# Patient Record
Sex: MALE | Race: WHITE | Employment: FULL TIME | URBAN - METROPOLITAN AREA
[De-identification: names, ages, dates, MRNs, and addresses within clinical notes are randomized per-mention and may not be internally consistent; named-entity substitution may affect disease eponyms.]

---

## 2017-07-05 ENCOUNTER — OFFICE VISIT (OUTPATIENT)
Dept: ORTHOPEDIC SURGERY | Age: 52
End: 2017-07-05

## 2017-07-05 VITALS
DIASTOLIC BLOOD PRESSURE: 77 MMHG | HEART RATE: 68 BPM | HEIGHT: 77 IN | SYSTOLIC BLOOD PRESSURE: 113 MMHG | BODY MASS INDEX: 29.52 KG/M2 | WEIGHT: 250 LBS

## 2017-07-05 DIAGNOSIS — M25.562 PAIN IN BOTH KNEES, UNSPECIFIED CHRONICITY: ICD-10-CM

## 2017-07-05 DIAGNOSIS — M25.561 PAIN IN BOTH KNEES, UNSPECIFIED CHRONICITY: ICD-10-CM

## 2017-07-05 DIAGNOSIS — M17.0 PRIMARY OSTEOARTHRITIS OF BOTH KNEES: Primary | ICD-10-CM

## 2017-07-05 PROCEDURE — 73564 X-RAY EXAM KNEE 4 OR MORE: CPT | Performed by: PHYSICIAN ASSISTANT

## 2017-07-05 PROCEDURE — 99213 OFFICE O/P EST LOW 20 MIN: CPT | Performed by: PHYSICIAN ASSISTANT

## 2017-07-05 PROCEDURE — 20610 DRAIN/INJ JOINT/BURSA W/O US: CPT | Performed by: PHYSICIAN ASSISTANT

## 2017-07-05 RX ORDER — MELOXICAM 15 MG/1
15 TABLET ORAL DAILY
Qty: 30 TABLET | Refills: 3 | Status: SHIPPED | OUTPATIENT
Start: 2017-07-05 | End: 2017-11-13 | Stop reason: SDUPTHER

## 2017-07-05 RX ORDER — FLUTICASONE FUROATE AND VILANTEROL TRIFENATATE 100; 25 UG/1; UG/1
1 POWDER RESPIRATORY (INHALATION) DAILY PRN
COMMUNITY
Start: 2017-06-17

## 2017-11-13 DIAGNOSIS — M17.0 PRIMARY OSTEOARTHRITIS OF BOTH KNEES: ICD-10-CM

## 2017-11-13 DIAGNOSIS — M25.562 PAIN IN BOTH KNEES, UNSPECIFIED CHRONICITY: ICD-10-CM

## 2017-11-13 DIAGNOSIS — M25.561 PAIN IN BOTH KNEES, UNSPECIFIED CHRONICITY: ICD-10-CM

## 2017-11-13 RX ORDER — MELOXICAM 15 MG/1
15 TABLET ORAL DAILY
Qty: 30 TABLET | Refills: 1 | Status: SHIPPED | OUTPATIENT
Start: 2017-11-13 | End: 2018-01-31 | Stop reason: SDUPTHER

## 2017-11-29 ENCOUNTER — OFFICE VISIT (OUTPATIENT)
Dept: ORTHOPEDIC SURGERY | Age: 52
End: 2017-11-29

## 2017-11-29 VITALS — WEIGHT: 250 LBS | HEIGHT: 77 IN | BODY MASS INDEX: 29.52 KG/M2

## 2017-11-29 DIAGNOSIS — M17.0 PRIMARY OSTEOARTHRITIS OF BOTH KNEES: Primary | ICD-10-CM

## 2017-11-29 PROCEDURE — 20611 DRAIN/INJ JOINT/BURSA W/US: CPT | Performed by: PHYSICIAN ASSISTANT

## 2017-11-29 PROCEDURE — 99213 OFFICE O/P EST LOW 20 MIN: CPT | Performed by: PHYSICIAN ASSISTANT

## 2017-11-29 NOTE — PROGRESS NOTES
3ML .5% MARCAINE RTU#[id number] LOT#[id number] EXP 12/18  2ML BETAMETHASONE KOL#[id number] LOT#[id number] EXP 2/19  1ML DEPOL MEDROL O#[id number] YRA#[id number]  EXP 4/20  RT AND LT KNEE INJECTIONS

## 2017-11-30 NOTE — PROGRESS NOTES
Diagnosis: Left and right knee osteoarthritis. Procedure: left and right knee cortisone injection    I discussed in detail the risks, benefits and complications of aninjection which included but are not limited to infection, skin reactions, hot swollen joint, and anaphylaxis with the patient. The patient verbalized understanding and gave informed consent for the left and right knee injection. The patient's knee was flexed to 90° and the skin prepped using sterile alcohol solution. A sterile 22-gauge needle was inserted into the knee and the mixture of 2ml Beta-Beta, 1 mL of Depo-Medrol 40 mg,3 mL of 0.25% Marcaine was injected under sterile technique. The needle was withdrawn and the puncture site sealed with a Band-Aid. Technique: Under sterile conditions a SonBooster ultrasound unit with a variable frequency (6.0-15.0 MHz) linear transducer was used to localize the placement of a 22-gauge needle into the knee joint. Findings: Successful needle placement for knee injection. Final images were taken and saved for permanent record. The patient tolerated the injection well. The patient was instructed to call the office immediately if there is any pain, redness, warmth, fever, or chills.

## 2018-01-25 ENCOUNTER — OFFICE VISIT (OUTPATIENT)
Dept: ORTHOPEDIC SURGERY | Age: 53
End: 2018-01-25

## 2018-01-25 VITALS
HEIGHT: 77 IN | HEART RATE: 59 BPM | WEIGHT: 250 LBS | BODY MASS INDEX: 29.52 KG/M2 | DIASTOLIC BLOOD PRESSURE: 76 MMHG | SYSTOLIC BLOOD PRESSURE: 121 MMHG

## 2018-01-25 DIAGNOSIS — M25.561 PAIN IN BOTH KNEES, UNSPECIFIED CHRONICITY: ICD-10-CM

## 2018-01-25 DIAGNOSIS — M17.12 PRIMARY OSTEOARTHRITIS OF LEFT KNEE: Primary | ICD-10-CM

## 2018-01-25 DIAGNOSIS — Z86.718 HISTORY OF DVT OF LOWER EXTREMITY: ICD-10-CM

## 2018-01-25 DIAGNOSIS — M25.562 PAIN IN BOTH KNEES, UNSPECIFIED CHRONICITY: ICD-10-CM

## 2018-01-25 PROCEDURE — L1830 KO IMMOB CANVAS LONG PRE OTS: HCPCS | Performed by: ORTHOPAEDIC SURGERY

## 2018-01-25 PROCEDURE — 99214 OFFICE O/P EST MOD 30 MIN: CPT | Performed by: ORTHOPAEDIC SURGERY

## 2018-01-25 NOTE — PROGRESS NOTES
place and person. The patient's mood and affect are appropriate. Lymphatic: The lymphatic examination bilaterally reveals all areas to be without enlargement or induration. Vascular: Examination reveals no swelling or calf tenderness. Peripheral pulses are palpable and 2+. Neurological: The patient has good coordination. There is no weakness or sensory deficit. Body mass index is 29.64 kg/m². Right and left Knee Examination:    Inspection:  No erythema or signs of infection. There are no cutaneous lesions. There is a varus deformity to both knees, worse on the right than the left    Palpation:  There is tenderness to palpation along the medial greater than patellofemoral joint line. Range of Motion:  0 extension to 120 of active flexion. Strength:  4+/5 quadriceps and hamstrings    Special Tests: The knee is stable to varus valgus stressing/anterior posterior drawer. Negative Homans test.                                 Skin: There are no rashes, ulcerations or lesions. Gait: mildly antalgic     Reflex 2+ patellar    Examination of the right and left hip reveals intact skin. The patient demonstrates full painless range of motion with regards to flexion, abduction, internal and external rotation. There is no tenderness about the greater trochanter. There is a negative straight leg raise against resistance. Strength is 5/5 throughout all planes. Radiology:   X-rays obtained and reviewed in office:  Views 4 views including AP weightbearing, PA flexed weightbearing, lateral, and skyline  Location  right and left knee:    Impression:   There is advanced medial joint space thinning with sclerosis and osteophyte formation present bilaterally. Patient has varus deformity. There is complete loss of articular cartilage space consistent with severe osteoarthritis.  The patellofemoral joint has osteophyte formation but the spaces are well-maintained on both sides  No fractures are identified. Impression:  Encounter Diagnoses   Name Primary?  Pain in both knees, unspecified chronicity     Primary osteoarthritis of left knee Yes       Office Procedures:  Orders Placed This Encounter   Procedures    Knee Bilateral Standard Extended VW     [id number]         Treatment Plan:  I discussed with the patient the nature of osteoarthritis of the knee. We talked about treatment of arthritis and the various options that are involved with this. The patient understands that the treatments can vary from essentially doing nothing to a total joint replacement arthroplasty for arthritis. I then went on to describe the utilization of glucosamine and chondroitin sulfate as a joint nutrition product. We talked about the fact that this is essentially a joint vitamin with typically minimal side effects. We also talked about utilization of prescription over-the-counter anti-inflammatory medications as the next option. We also discussed the possibility of brace wear or orthotic wear if the patient has significant varus alignment. We then went on to discuss the possibility of Visco supplementation with hyaluronate products. We talked about the typical course of this type of treatment and the fact that often times in the treatment for significant arthritis, this is successful less than half the time. We also talked about the corticosteroid injections and the fact that this can give a brief window of relief, but does not cure the problem; in fact, the pain often has a rebound effect in 6-10 weeks after the steroid has worn off. We also discussed arthroscopy surgery in attempts to debride the joint, but the fact that this is relatively unreliable treatment in the face of significant arthritis. It can occasionally be used, particularly if there is significant meniscus pathology. Lastly we discussed total joint replacement arthroplasty as the final and definitive step in treatment of arthritis.  Patient realizes the

## 2018-01-29 ENCOUNTER — TELEPHONE (OUTPATIENT)
Dept: ORTHOPEDIC SURGERY | Age: 53
End: 2018-01-29

## 2018-01-29 DIAGNOSIS — M25.562 LEFT KNEE PAIN, UNSPECIFIED CHRONICITY: Primary | ICD-10-CM

## 2018-01-31 ENCOUNTER — HOSPITAL ENCOUNTER (OUTPATIENT)
Dept: PHYSICAL THERAPY | Age: 53
Discharge: HOME | End: 2018-01-31
Admitting: ORTHOPAEDIC SURGERY

## 2018-01-31 DIAGNOSIS — M25.562 PAIN IN BOTH KNEES, UNSPECIFIED CHRONICITY: ICD-10-CM

## 2018-01-31 DIAGNOSIS — M25.561 PAIN IN BOTH KNEES, UNSPECIFIED CHRONICITY: ICD-10-CM

## 2018-01-31 DIAGNOSIS — M17.0 PRIMARY OSTEOARTHRITIS OF BOTH KNEES: ICD-10-CM

## 2018-01-31 NOTE — PLAN OF CARE
functional strength   []Reduced balance/proprioceptive control   []other:      Functional Activity Limitations (from functional questionnaire and intake)   [x]Reduced ability to tolerate prolonged functional positions   []Reduced ability or difficulty with changes of positions or transfers between positions   []Reduced ability to maintain good posture and demonstrate good body mechanics with sitting, bending, and lifting   [x]Reduced ability to sleep   [] Reduced ability or tolerance with driving and/or computer work   []Reduced ability to perform lifting, carrying tasks   [x]Reduced ability to squat   []Reduced ability to forward bend   [x]Reduced ability to ambulate prolonged functional periods/distances/surfaces   [x]Reduced ability to ascend/descend stairs   [x]Reduced ability to run, hop, cut or jump   []other:    Participation Restrictions   []Reduced participation in self care activities   []Reduced participation in home management activities   [x]Reduced participation in work activities   [x]Reduced participation in social activities. [x]Reduced participation in sport/recreation activities. Classification :    []Signs/symptoms consistent with post-surgical status including decreased ROM, strength and function.    []Signs/symptoms consistent with joint sprain/strain   []Signs/symptoms consistent with patella-femoral syndrome   [x]Signs/symptoms consistent with knee OA/hip OA   []Signs/symptoms consistent with internal derangement of knee/Hip   []Signs/symptoms consistent with functional hip weakness/NMR control      []Signs/symptoms consistent with tendinitis/tendinosis    []signs/symptoms consistent with pathology which may benefit from Dry needling      []other:      Prognosis/Rehab Potential:      []Excellent   []Good    [x]Fair   []Poor    Tolerance of evaluation/treatment:    []Excellent   []Good    [x]Fair   []Poor  Physical Therapy Evaluation Complexity Justification  [x] A history of present problem with:  [] no personal factors and/or comorbidities that impact the plan of care;  [x]1-2 personal factors and/or comorbidities that impact the plan of care  []3 personal factors and/or comorbidities that impact the plan of care  [x] An examination of body systems using standardized tests and measures addressing any of the following: body structures and functions (impairments), activity limitations, and/or participation restrictions;:  [] a total of 1-2 or more elements   [x] a total of 3 or more elements   [] a total of 4 or more elements   [x] A clinical presentation with:  [x] stable and/or uncomplicated characteristics   [] evolving clinical presentation with changing characteristics  [] unstable and unpredictable characteristics;   [x] Clinical decision making of [x] low, [] moderate, [] high complexity using standardized patient assessment instrument and/or measurable assessment of functional outcome. [x] EVAL (LOW) 38438 (typically 20 minutes face-to-face)  [] EVAL (MOD) 81518 (typically 30 minutes face-to-face)  [] EVAL (HIGH) 07848 (typically 45 minutes face-to-face)  [] RE-EVAL       PLAN:   Frequency/Duration:  1 visit    Interventions:  [x]  Therapeutic exercise including: strength training, ROM, for Lower extremity and core   [x]  NMR activation and proprioception for LE, Glutes and Core   []  Manual therapy as indicated for LE, Hip and spine to include: Dry Needling/IASTM, STM, PROM, Gr I-IV mobilizations, manipulation. [] Modalities as needed that may include: thermal agents, E-stim, Biofeedback, US, iontophoresis as indicated  [x] Patient education on joint protection, postural re-education, activity modification, progression of HEP. [x] Patient appears to be functionally prepared for surgery and will continue with a home exercise program until surgery date.   [] Patient will be ready for surgery with a short period of structured physical therapy  [] Patient does not appear to be functionally ready for surgery and requires a prolonged  structure program    At this point, it appears patient's discharge status from hospital should be:   [x] Home with home exercise program  [] Home with home health care  [] Skilled nursing facility    HEP instruction: (see flowsheet)    GOALS:  Patient stated goal: \"To be ready for surgery. \"    Therapist goals for Patient:   Short Term Goals: To be achieved in: 1 visit  1. Independent in HEP and progression per patient tolerance, in order to prevent re-injury.          Electronically signed by:  Carri Jernigan, [address] [facility], DPT [id number]

## 2018-02-01 ENCOUNTER — HOSPITAL ENCOUNTER (OUTPATIENT)
Dept: PHYSICAL THERAPY | Age: 53
Discharge: HOME | End: 2018-02-28
Attending: ORTHOPAEDIC SURGERY | Admitting: ORTHOPAEDIC SURGERY

## 2018-02-01 RX ORDER — MELOXICAM 15 MG/1
15 TABLET ORAL DAILY
Qty: 30 TABLET | Refills: 0 | Status: SHIPPED | OUTPATIENT
Start: 2018-02-01 | End: 2018-03-05 | Stop reason: SDUPTHER

## 2018-02-08 ENCOUNTER — TELEPHONE (OUTPATIENT)
Dept: ORTHOPEDIC SURGERY | Age: 53
End: 2018-02-08

## 2018-02-08 NOTE — TELEPHONE ENCOUNTER
Patient scheduled for surgery 3/6/18, nurse navigator called patient to make sure visit with primary care physician for pre-op visit was scheduled and pre-op lab work was scheduled. Discussed PAT class every Tuesday at [address]. PCP Scheduled: Scheduled    Specialist: Cardiologist/ Hemoc/ Vascular: Cardiologist: for PVCs - scheduled 2/14/18  Hemotologist- in discussion with Dr. Ale Arzola about post op medication- pt has history of blood clots. Prehab scheduled: Completed    CT scan: 2/19/18     Lab work schedule: Completed    Discharge plans: Home with HHC vs Out ptPT per pre-habs assessment. Who will care for patient after discharge: Wife    Equipment patient has at home: Crutches    OrthoVitals: Has kit, is registered, and has no questions    Instructed pt a pre admission nurse would call to review medical history and medications prior to surgery. Also instructed patient to call with any questions or concerns.     Jazmin Davis  Orthopedic Nurse Navigator  Phone number [phone]

## 2018-02-20 ENCOUNTER — TELEPHONE (OUTPATIENT)
Dept: ORTHOPEDIC SURGERY | Age: 53
End: 2018-02-20

## 2018-02-23 ENCOUNTER — TELEPHONE (OUTPATIENT)
Dept: ORTHOPEDIC SURGERY | Age: 53
End: 2018-02-23

## 2018-03-01 ENCOUNTER — HOSPITAL ENCOUNTER (OUTPATIENT)
Dept: PHYSICAL THERAPY | Age: 53
Discharge: HOME | End: 2018-03-31
Attending: ORTHOPAEDIC SURGERY | Admitting: ORTHOPAEDIC SURGERY

## 2018-03-05 DIAGNOSIS — M17.0 PRIMARY OSTEOARTHRITIS OF BOTH KNEES: ICD-10-CM

## 2018-03-05 DIAGNOSIS — M25.562 PAIN IN BOTH KNEES, UNSPECIFIED CHRONICITY: ICD-10-CM

## 2018-03-05 DIAGNOSIS — M25.561 PAIN IN BOTH KNEES, UNSPECIFIED CHRONICITY: ICD-10-CM

## 2018-03-06 PROBLEM — Z96.652 STATUS POST TOTAL LEFT KNEE REPLACEMENT: Status: ACTIVE | Noted: 2018-03-06

## 2018-03-09 ENCOUNTER — TELEPHONE (OUTPATIENT)
Dept: ORTHOPEDIC SURGERY | Age: 53
End: 2018-03-09

## 2018-03-09 RX ORDER — MELOXICAM 15 MG/1
15 TABLET ORAL DAILY
Qty: 30 TABLET | Refills: 0 | Status: SHIPPED | OUTPATIENT
Start: 2018-03-09 | End: 2018-04-08 | Stop reason: SDUPTHER

## 2018-03-10 ENCOUNTER — HOSPITAL ENCOUNTER (OUTPATIENT)
Dept: PHYSICAL THERAPY | Age: 53
Discharge: HOME | End: 2018-03-11
Admitting: ORTHOPAEDIC SURGERY

## 2018-03-10 NOTE — FLOWSHEET NOTE
[facility], [address] [place] [address]  Phone: [phone]  Fax [phone]    Physical Therapy Daily Treatment Note  Date:  3/10/2018    Patient Name:  Zoya Metzger    :  1965  MRN: [id number]  Restrictions/Precautions:    Physician Information:  Referring Practitioner: Dr. Rafia Duarte  Medical/Treatment Diagnosis Information:  · Diagnosis: Left Knee OA (M17.2) s/p TKR 3/6/18  · Treatment Diagnosis: Left Knee Pain (M25.562) / Difficulty Walking (R26.2)    [] Conservative / [x] Surgical - DOS: 3/6/18  Therapy Diagnosis/Practice Pattern:  Practice Pattern H: Joint Arthroplasty  Insurance/Certification information:  PT Insurance Information: ANTHEM  - 50/[id number]-80/20-$0CP-60PT/OT-NO AUTH  (1 used for pre-op)  Plan of care signed: [] YES  [x] NO  Number of Comorbidities:  []0     [x]1-2    []3+  Date of Patient follow up with Physician:     G-Code (if applicable):      Date G-Code Applied:  3/10/18  PT G-Codes  Functional Assessment Tool Used: LEFS  Score: 93%  Functional Limitation: Mobility: Walking and moving around  Mobility: Walking and Moving Around Current Status (): At least 80 percent but less than 100 percent impaired, limited or restricted  Mobility: Walking and Moving Around Goal Status ():  At least 20 percent but less than 40 percent impaired, limited or restricted    Progress Note: [x]  Yes  []  No  Next due by: Visit #10        Latex Allergy:  [x]NO      []YES  Preferred Language for Healthcare:   [x]English       []other:    Visit # Insurance Allowable Reporting Period   1 61 Begin Date: 3/10/2018               End Date:      RECERT DUE BY: 34 (12 weeks)    SUBJECTIVE:  See eval    OBJECTIVE: See eval  Observation:  Palpation:     Test used Initial score Current Score   Pain Summary VAS 7-8/10    Functional questionnaire LEFS 93%    ROM flexion 90 deg (heel slide)     extension Lacking 8 deg increase in Strength to good proximal hip strength and control, within 5lb HHD in LE to allow for proper functional mobility as indicated by patients Functional Deficits. 4. Patient will return to ADLs/  functional activities without increased symptoms or restriction. 5. Patient will ambulate with symmetrical gait and reciprocal gait on stairs necessary to perform work duties and manage home. 6. Be prepared for right TKR. New or Updated Goals (if applicable):  [x] No change to goals established upon initial eval/last progress note:  New Goals:    Progression Towards Functional goals:   [] Patient is progressing as expected towards functional goals listed. [] Progression is slowed due to complexities listed. [] Progression has been slowed due to co-morbidities.   [x] Plan just implemented, too soon to assess goals progression  [] Other:     ASSESSMENT:    [] Improvement noted relative to goals:  [] No Improvement noted related to goals:  Summary/Patient's response to treatment: See Eval    Treatment/Activity Tolerance:  [x] Patient tolerated treatment well [] Patient limited by fatique  [] Patient limited by pain  [] Patient limited by other medical complications  [] Other:     Prognosis: [x] Good [] Fair  [] Poor    Patient Requires Follow-up: [x] Yes  [] No    PLAN: See eval  [] Continue per plan of care [] Alter current plan (see comments)  [x] Plan of care initiated [] Hold pending MD visit [] Discharge    Electronically signed by: Keya Gaytan 429

## 2018-03-10 NOTE — PLAN OF CARE
[facility], [address] [facility]  [address] [place] [address], [address] [place] [address]  Phone: [phone]  Fax [phone]     Physical Therapy Certification    Dear Referring Practitioner: Dr. Natividad Chau,    We had the pleasure of evaluating the following patient for physical therapy services at [address] [place] [address]. A summary of our findings can be found in the initial assessment below. This includes our plan of care. If you have any questions or concerns regarding these findings, please do not hesitate to contact me at the office phone number checked above. Thank you for the referral.       Physician Signature:_______________________________Date:__________________  By signing above (or electronic signature), therapists plan is approved by physician    Patient: Yann Trammell   : 1965   MRN: [id number]  Referring Physician: Referring Practitioner: Dr. Natividad Chau      Evaluation Date: 3/10/2018      Medical Diagnosis Information:  Diagnosis: Left Knee OA (M17.2) s/p TKR 3/6/18   Treatment Diagnosis: Left Knee Pain (M25.562) / Difficulty Walking (R26.2)                                         Insurance information: PT Insurance Information: ANTHEM  - 50/25-300D-80/20-$0CP-60PT/OT-NO AUTH  (1 used for pre-op)     Precautions/ Contra-indications: Hx of DVT, PVCs, right knee OA needing TKR  Latex Allergy:  [x]NO      []YES  Preferred Language for Healthcare:   [x]English       []other:    SUBJECTIVE: Patient stated complaint:  Patient reports chronic hx of bilateral knee pain. Pain in the left knee increasingly worse than right. Kept him awake at night and limited function with work activities and ADLs. Hx of scopes bilateral knees. Hoping to get right TKR in next 6-8 weeks. Left TKR 3/6/18. Nerve block lasted 2 days but continues to have weakness in the quad pt states. Sleeping on recliner couch on 1st floor at this time.   Step to gait with walker standard walker, step to gait pattern    Orthopedic Special Tests: Negative Ayad's                       [x] Patient history, allergies, meds reviewed. Medical chart reviewed. See intake form. Review Of Systems (ROS):  [x]Performed Review of systems (Integumentary, CardioPulmonary, Neurological) by intake and observation. Intake form has been scanned into medical record. Patient has been instructed to contact their primary care physician regarding ROS issues if not already being addressed at this time. Co-morbidities/Complexities (which will affect course of rehabilitation):   []None           Arthritic conditions   []Rheumatoid arthritis (M05.9)  [x]Osteoarthritis (M19.91)   Cardiovascular conditions   []Hypertension (I10)  []Hyperlipidemia (E78.5)  []Angina pectoris (I20)  []Atherosclerosis (I70)   Musculoskeletal conditions   []Disc pathology   []Congenital spine pathologies   [x]Prior surgical intervention  []Osteoporosis (M81.8)  []Osteopenia (M85.8)   Endocrine conditions   []Hypothyroid (E03.9)  []Hyperthyroid Gastrointestinal conditions   []Constipation (F52.59)   Metabolic conditions   []Morbid obesity (E66.01)  []Diabetes type 1(E10.65) or 2 (E11.65)   []Neuropathy (G60.9)     Pulmonary conditions   []Asthma (J45)  []Coughing   []COPD (J44.9)   Psychological Disorders  []Anxiety (F41.9)  []Depression (F32.9)   []Other:   [x]Other:     Hx of DVT  Right Knee OA needing TKR     Barriers to/and or personal factors that will affect rehab potential:              []Age  []Sex              []Motivation/Lack of Motivation                        []Co-Morbidities              []Cognitive Function, education/learning barriers              []Environmental, home barriers              [x]profession/work barriers  []past PT/medical experience  []other:  Justification: Patient has a very physical job to return to along with right knee wanting to be replaced in 6-8 weeks.      Falls Risk Assessment (30 days):   [x]

## 2018-03-15 ENCOUNTER — TELEPHONE (OUTPATIENT)
Dept: CASE MANAGEMENT | Age: 53
End: 2018-03-15

## 2018-03-16 ENCOUNTER — HOSPITAL ENCOUNTER (OUTPATIENT)
Dept: PHYSICAL THERAPY | Age: 53
Discharge: HOME | End: 2018-03-17
Admitting: ORTHOPAEDIC SURGERY

## 2018-03-16 NOTE — FLOWSHEET NOTE
Test used Initial score Current Score   Pain Summary VAS 7-8/10 4/10   Functional questionnaire LEFS 93% 63%   ROM flexion 90 deg (heel slide) 100    extension Lacking 8 deg Lacking 2 degrees   Strength quad Poor Good -    SLR Unable to perform Independent/              RESTRICTIONS/PRECAUTIONS: Hx of DVT, PVCs, right knee OA needing TKR    Exercises/Interventions: Demo HEP with mod cues for proper technique. Therapeutic Ex Sets/reps Notes   Long sitting HS stretch 4 x 20\" HEP   gastroc belt stretch 4 x 20\" HEP   SLR flexion Mod assist x 3  Unable to perform   Quad set 10 x 5\"  HEP   EOB flexion  10 x 5\" HEP   Heel slide with belt 10 x 5\" HEP   SB Heelslides 30x    Hip Abd 3 x 10    Bike  5 min         Mini squats Reviewed for HEP HEP                            Manual Intervention     Patellar mobs, PROM, STM Distal Quad X 10 minutes                             NMR re-education     NMES QS 10 sec on/10 off 8 min    Gait training one crutch  3 min                                           Therapeutic Exercise and NMR EXR  [x] (90246) Provided verbal/tactile cueing for activities related to strengthening, flexibility, endurance, ROM for improvements in LE, proximal hip, and core control with self care, mobility, lifting, ambulation.  [] (59227) Provided verbal/tactile cueing for activities related to improving balance, coordination, kinesthetic sense, posture, motor skill, proprioception  to assist with LE, proximal hip, and core control in self care, mobility, lifting, ambulation and eccentric single leg control.      NMR and Therapeutic Activities:    [x] (96293 or 78261) Provided verbal/tactile cueing for activities related to improving balance, coordination, kinesthetic sense, posture, motor skill, proprioception and motor activation to allow for proper function of core, proximal hip and LE with self care and ADLs  [] (19944) Gait Re-education- Provided training and instruction to the patient for proper LE, core

## 2018-03-17 ENCOUNTER — HOSPITAL ENCOUNTER (OUTPATIENT)
Dept: PHYSICAL THERAPY | Age: 53
Discharge: HOME | End: 2018-02-28

## 2018-03-19 ENCOUNTER — OFFICE VISIT (OUTPATIENT)
Dept: ORTHOPEDIC SURGERY | Age: 53
End: 2018-03-19

## 2018-03-19 ENCOUNTER — HOSPITAL ENCOUNTER (OUTPATIENT)
Dept: PHYSICAL THERAPY | Age: 53
Discharge: HOME | End: 2018-03-20
Admitting: ORTHOPAEDIC SURGERY

## 2018-03-19 DIAGNOSIS — Z96.652 STATUS POST TOTAL LEFT KNEE REPLACEMENT: ICD-10-CM

## 2018-03-19 DIAGNOSIS — M25.562 LEFT KNEE PAIN, UNSPECIFIED CHRONICITY: Primary | ICD-10-CM

## 2018-03-19 PROCEDURE — 99024 POSTOP FOLLOW-UP VISIT: CPT | Performed by: ORTHOPAEDIC SURGERY

## 2018-03-19 RX ORDER — OXYCODONE HYDROCHLORIDE AND ACETAMINOPHEN 5; 325 MG/1; MG/1
1-2 TABLET ORAL EVERY 4 HOURS PRN
Qty: 40 TABLET | Refills: 0 | Status: SHIPPED | OUTPATIENT
Start: 2018-03-19 | End: 2018-03-26

## 2018-03-19 NOTE — FLOWSHEET NOTE
[facility], [address] [place] [address] Subhash  Phone: [phone]  Fax [phone]    Physical Therapy Daily Treatment Note  Date:  3/19/2018    Patient Name:  Mee Benz    :  1965  MRN: [id number]  Restrictions/Precautions:    Physician Information:  Referring Practitioner: Dr. Sudeep Billy  Medical/Treatment Diagnosis Information:  · Diagnosis: Left Knee OA (M17.2) s/p TKR 3/6/18  · Treatment Diagnosis: Left Knee Pain (M25.562) / Difficulty Walking (R26.2)    [] Conservative / [x] Surgical - DOS: 3/6/18  Therapy Diagnosis/Practice Pattern:  Practice Pattern H: Joint Arthroplasty  Insurance/Certification information:  PT Insurance Information: ANTHEM  - [id number]-80/20-$0CP-60PT/OT-NO AUTH  (1 used for pre-op)  Plan of care signed: [] YES  [x] NO  Number of Comorbidities:  []0     [x]1-2    []3+  Date of Patient follow up with Physician:     G-Code (if applicable):      Date G-Code Applied:  3/10/18  PT G-Codes  Functional Assessment Tool Used: LEFS  Score: 93%  Functional Limitation: Mobility: Walking and moving around  Mobility: Walking and Moving Around Current Status (): At least 80 percent but less than 100 percent impaired, limited or restricted  Mobility: Walking and Moving Around Goal Status (): At least 20 percent but less than 40 percent impaired, limited or restricted    Progress Note: []  Yes  [x]  No  Next due by: Visit #10        Latex Allergy:  [x]NO      []YES  Preferred Language for Healthcare:   [x]English       []other:    Visit # Insurance Allowable Reporting Period   4  Begin Date: 3/10/2018               End Date:      RECERT DUE BY:  (12 weeks)    SUBJECTIVE: Patient states that knee is feeling better. Just had a good f/u with MD, said to continue focusing on ROM. OBJECTIVE: Significant improvement with knee flexion ROM today  Patient denies any calf pain.   Observation: quad tone

## 2018-03-22 ENCOUNTER — HOSPITAL ENCOUNTER (OUTPATIENT)
Dept: PHYSICAL THERAPY | Age: 53
Discharge: HOME | End: 2018-03-23
Admitting: ORTHOPAEDIC SURGERY

## 2018-03-22 NOTE — FLOWSHEET NOTE
[facility], [address] [facility] [place]  Phone: [phone]  Fax [phone]    Physical Therapy Daily Treatment Note  Date:  3/22/2018    Patient Name:  Nidia Kapadia    :  1965  MRN: [id number]  Restrictions/Precautions:    Physician Information:  Referring Practitioner: Dr. Vivienne Fry  Medical/Treatment Diagnosis Information:  · Diagnosis: Left Knee OA (M17.2) s/p TKR 3/6/18  · Treatment Diagnosis: Left Knee Pain (M25.562) / Difficulty Walking (R26.2)    [] Conservative / [x] Surgical - DOS: 3/6/18  Therapy Diagnosis/Practice Pattern:  Practice Pattern H: Joint Arthroplasty  Insurance/Certification information:  PT Insurance Information: ANTHEM  - 50/25-300D-80/20-$0CP-60PT/OT-NO AUTH  (1 used for pre-op)  Plan of care signed: [] YES  [x] NO  Number of Comorbidities:  []0     [x]1-2    []3+  Date of Patient follow up with Physician:     G-Code (if applicable):      Date G-Code Applied:  3/10/18  PT G-Codes  Functional Assessment Tool Used: LEFS  Score: 93%  Functional Limitation: Mobility: Walking and moving around  Mobility: Walking and Moving Around Current Status (): At least 80 percent but less than 100 percent impaired, limited or restricted  Mobility: Walking and Moving Around Goal Status (): At least 20 percent but less than 40 percent impaired, limited or restricted    Progress Note: []  Yes  [x]  No  Next due by: Visit #10        Latex Allergy:  [x]NO      []YES  Preferred Language for Healthcare:   [x]English       []other:    Visit # Insurance Allowable Reporting Period   5 59 Begin Date: 3/10/2018               End Date:      RECERT DUE BY: 64 (12 weeks)    SUBJECTIVE: Pt reports having more discomfort than pain. Walking and stairs are getting easier but he isn't yet leading with the L knee. He rides the stationary bike at home everyday to keep the motion. Pain 3/10.      OBJECTIVE: Held SLB

## 2018-03-23 ENCOUNTER — TELEPHONE (OUTPATIENT)
Dept: CASE MANAGEMENT | Age: 53
End: 2018-03-23

## 2018-03-23 NOTE — TELEPHONE ENCOUNTER
Spoke with pt, doing well. Incision status: No drainage or redness    Edema/Swelling/Teds: Almost gone completely. Pain level and status: Discomfort now. Icing and elevating. Use of pain medications: Off pain meds, using tylenol    Blood thinner: Using aspirin    Bowels: Moving fine    Home Care Agency active: N/A    Outpatient therapy: Going well. Do you have all of your medications: Yes    Changes in medications: No    Ortho Vitals: using fine    Follow up appointments:    Future Appointments  Date Time Provider Lupe Leon   3/27/2018 9:30 AM Nelsy Lima, PT [facility] None   3/30/2018 9:30 AM Kemar Suero, PT [facility] None   4/2/2018 11:30 AM Nelsy Lima PT [facility] None   4/4/2018 9:20 AM Cheryle Skains, PA [facility]   4/5/2018 1:30 PM Nelsy Lima PT [facility] None   4/9/2018 3:30 PM Nelsy Lima PT [facility] None   4/12/2018 3:30 PM Conrado Graff, PT [facility] None     Signed off from pt. Instructed pt to call RN or [facility] office with any issues or concerns.

## 2018-03-27 ENCOUNTER — HOSPITAL ENCOUNTER (OUTPATIENT)
Dept: PHYSICAL THERAPY | Age: 53
Discharge: HOME | End: 2018-03-28
Admitting: ORTHOPAEDIC SURGERY

## 2018-03-27 NOTE — FLOWSHEET NOTE
Traction (13253)  [] ES(attended) (51466)      [x] ES (un) (25473):     GOALS:   Patient stated goal: To improved knee strength and mobility     Therapist goals for Patient:   Short Term Goals: To be achieved in: 2 weeks  1. Independent in HEP and progression per patient tolerance, in order to prevent re-injury. 2. Patient will have a decrease in pain to facilitate improvement in movement, function, and ADLs as indicated by Functional Deficits.     Long Term Goals: To be achieved in: 12 weeks  1. Disability index score of 35% or less for the LEFS to assist with reaching prior level of function. 2. Patient will demonstrate increased AROM to 0-120 deg to allow for proper joint functioning as indicated by patients Functional Deficits. 3. Patient will demonstrate an increase in Strength to good proximal hip strength and control, within 5lb HHD in LE to allow for proper functional mobility as indicated by patients Functional Deficits. 4. Patient will return to ADLs/  functional activities without increased symptoms or restriction. 5. Patient will ambulate with symmetrical gait and reciprocal gait on stairs necessary to perform work duties and manage home. 6. Be prepared for right TKR. New or Updated Goals (if applicable):  [x] No change to goals established upon initial eval/last progress note:  New Goals:    Progression Towards Functional goals:   [x] Patient is progressing as expected towards functional goals listed. [] Progression is slowed due to complexities listed. [] Progression has been slowed due to co-morbidities. [] Plan just implemented, too soon to assess goals progression  [] Other:     ASSESSMENT: Pt reports the only time he ever has true soreness is along med quad insertion. He is tight in his HS but has a hard time relaxing during manual HS S. He is doing heel slides at home.  Improved sit to stand ability today from 1 airex but demo min difficulty standing and controlling descent. [x] Improvement noted relative to goals:  [] No Improvement noted related to goals:      Treatment/Activity Tolerance:  [x] Patient tolerated treatment well [] Patient limited by fatique  [] Patient limited by pain  [] Patient limited by other medical complications  [] Other:     Prognosis: [x] Good [] Fair  [] Poor    Patient Requires Follow-up: [x] Yes  [] No    PLAN:   [x] Continue per plan of care [] Alter current plan (see comments)  [] Plan of care initiated [] Hold pending MD visit [] Discharge    Electronically signed by: Manju Giordano PT  Esteban Re, SPT Therapist was present, directed the patient's care, made skilled judgement, and was responsible for assessment and treatment of the patient.

## 2018-03-30 ENCOUNTER — HOSPITAL ENCOUNTER (OUTPATIENT)
Dept: PHYSICAL THERAPY | Age: 53
Discharge: HOME | End: 2018-03-31
Admitting: ORTHOPAEDIC SURGERY

## 2018-04-01 ENCOUNTER — HOSPITAL ENCOUNTER (OUTPATIENT)
Dept: PHYSICAL THERAPY | Age: 53
Discharge: HOME | End: 2018-04-30
Attending: ORTHOPAEDIC SURGERY | Admitting: ORTHOPAEDIC SURGERY

## 2018-04-03 ENCOUNTER — HOSPITAL ENCOUNTER (OUTPATIENT)
Dept: PHYSICAL THERAPY | Age: 53
Discharge: HOME | End: 2018-04-04
Admitting: ORTHOPAEDIC SURGERY

## 2018-04-04 ENCOUNTER — OFFICE VISIT (OUTPATIENT)
Dept: ORTHOPEDIC SURGERY | Age: 53
End: 2018-04-04

## 2018-04-04 DIAGNOSIS — M17.0 PRIMARY OSTEOARTHRITIS OF BOTH KNEES: ICD-10-CM

## 2018-04-04 DIAGNOSIS — Z96.652 STATUS POST TOTAL LEFT KNEE REPLACEMENT: Primary | ICD-10-CM

## 2018-04-04 PROCEDURE — 99213 OFFICE O/P EST LOW 20 MIN: CPT | Performed by: PHYSICIAN ASSISTANT

## 2018-04-05 ENCOUNTER — HOSPITAL ENCOUNTER (OUTPATIENT)
Dept: PHYSICAL THERAPY | Age: 53
Discharge: HOME | End: 2018-04-06
Admitting: ORTHOPAEDIC SURGERY

## 2018-04-05 DIAGNOSIS — M25.561 RIGHT KNEE PAIN, UNSPECIFIED CHRONICITY: Primary | ICD-10-CM

## 2018-04-08 DIAGNOSIS — M25.562 PAIN IN BOTH KNEES, UNSPECIFIED CHRONICITY: ICD-10-CM

## 2018-04-08 DIAGNOSIS — M25.561 PAIN IN BOTH KNEES, UNSPECIFIED CHRONICITY: ICD-10-CM

## 2018-04-08 DIAGNOSIS — M17.0 PRIMARY OSTEOARTHRITIS OF BOTH KNEES: ICD-10-CM

## 2018-04-09 ENCOUNTER — HOSPITAL ENCOUNTER (OUTPATIENT)
Dept: PHYSICAL THERAPY | Age: 53
Discharge: HOME | End: 2018-04-10
Admitting: ORTHOPAEDIC SURGERY

## 2018-04-10 RX ORDER — MELOXICAM 15 MG/1
15 TABLET ORAL DAILY
Qty: 30 TABLET | Refills: 0 | Status: SHIPPED | OUTPATIENT
Start: 2018-04-10 | End: 2018-05-30 | Stop reason: SDUPTHER

## 2018-04-13 ENCOUNTER — HOSPITAL ENCOUNTER (OUTPATIENT)
Dept: PHYSICAL THERAPY | Age: 53
Discharge: HOME | End: 2018-04-14
Admitting: ORTHOPAEDIC SURGERY

## 2018-04-17 ENCOUNTER — HOSPITAL ENCOUNTER (OUTPATIENT)
Dept: PHYSICAL THERAPY | Age: 53
Discharge: HOME | End: 2018-04-18
Admitting: ORTHOPAEDIC SURGERY

## 2018-04-23 ENCOUNTER — TELEPHONE (OUTPATIENT)
Dept: ORTHOPEDIC SURGERY | Age: 53
End: 2018-04-23

## 2018-04-23 ENCOUNTER — HOSPITAL ENCOUNTER (OUTPATIENT)
Dept: PHYSICAL THERAPY | Age: 53
Discharge: HOME | End: 2018-04-24
Admitting: ORTHOPAEDIC SURGERY

## 2018-04-24 ENCOUNTER — TELEPHONE (OUTPATIENT)
Dept: ORTHOPEDIC SURGERY | Age: 53
End: 2018-04-24

## 2018-04-30 ENCOUNTER — OFFICE VISIT (OUTPATIENT)
Dept: ORTHOPEDIC SURGERY | Age: 53
End: 2018-04-30

## 2018-04-30 ENCOUNTER — HOSPITAL ENCOUNTER (OUTPATIENT)
Dept: PHYSICAL THERAPY | Age: 53
Discharge: HOME | End: 2018-05-01
Admitting: ORTHOPAEDIC SURGERY

## 2018-04-30 DIAGNOSIS — Z96.652 STATUS POST TOTAL LEFT KNEE REPLACEMENT: Primary | ICD-10-CM

## 2018-04-30 PROCEDURE — 99024 POSTOP FOLLOW-UP VISIT: CPT | Performed by: ORTHOPAEDIC SURGERY

## 2018-05-01 ENCOUNTER — HOSPITAL ENCOUNTER (OUTPATIENT)
Dept: PHYSICAL THERAPY | Age: 53
Discharge: HOME | End: 2018-05-31
Attending: ORTHOPAEDIC SURGERY | Admitting: ORTHOPAEDIC SURGERY

## 2018-05-01 ENCOUNTER — HOSPITAL ENCOUNTER (OUTPATIENT)
Dept: OTHER | Age: 53
Discharge: HOME | End: 2018-05-01
Attending: ORTHOPAEDIC SURGERY | Admitting: ORTHOPAEDIC SURGERY

## 2018-05-01 LAB
ABO/RH: NORMAL
ALBUMIN SERPL-MCNC: 4.3 G/DL (ref 3.4–5)
ANION GAP SERPL CALCULATED.3IONS-SCNC: 16 MMOL/L (ref 3–16)
ANTIBODY SCREEN: NORMAL
APTT: 27.6 SEC (ref 24.1–34.9)
BASOPHILS ABSOLUTE: 0 K/UL (ref 0–0.2)
BASOPHILS RELATIVE PERCENT: 0.3 %
BILIRUBIN URINE: NEGATIVE
BLOOD, URINE: NEGATIVE
BUN BLDV-MCNC: 17 MG/DL (ref 7–20)
CALCIUM SERPL-MCNC: 8.9 MG/DL (ref 8.3–10.6)
CHLORIDE BLD-SCNC: 104 MMOL/L (ref 99–110)
CLARITY: CLEAR
CO2: 22 MMOL/L (ref 21–32)
COLOR: YELLOW
CREAT SERPL-MCNC: 0.9 MG/DL (ref 0.9–1.3)
EOSINOPHILS ABSOLUTE: 0.3 K/UL (ref 0–0.6)
EOSINOPHILS RELATIVE PERCENT: 4.7 %
GFR AFRICAN AMERICAN: >60
GFR NON-AFRICAN AMERICAN: >60
GLUCOSE BLD-MCNC: 86 MG/DL (ref 70–99)
GLUCOSE URINE: NEGATIVE MG/DL
HCT VFR BLD CALC: 41 % (ref 40.5–52.5)
HEMOGLOBIN: 14 G/DL (ref 13.5–17.5)
INR BLD: 1.17 (ref 0.85–1.15)
KETONES, URINE: NEGATIVE MG/DL
LEUKOCYTE ESTERASE, URINE: NEGATIVE
LYMPHOCYTES ABSOLUTE: 2 K/UL (ref 1–5.1)
LYMPHOCYTES RELATIVE PERCENT: 36.9 %
MCH RBC QN AUTO: 29.4 PG (ref 26–34)
MCHC RBC AUTO-ENTMCNC: 34.3 G/DL (ref 31–36)
MCV RBC AUTO: 85.8 FL (ref 80–100)
MICROSCOPIC EXAMINATION: NORMAL
MONOCYTES ABSOLUTE: 0.6 K/UL (ref 0–1.3)
MONOCYTES RELATIVE PERCENT: 11.4 %
NEUTROPHILS ABSOLUTE: 2.5 K/UL (ref 1.7–7.7)
NEUTROPHILS RELATIVE PERCENT: 46.7 %
NITRITE, URINE: NEGATIVE
PDW BLD-RTO: 14 % (ref 12.4–15.4)
PH UA: 5.5
PLATELET # BLD: 154 K/UL (ref 135–450)
PMV BLD AUTO: 9 FL (ref 5–10.5)
POTASSIUM SERPL-SCNC: 4.9 MMOL/L (ref 3.5–5.1)
PROTEIN UA: NEGATIVE MG/DL
PROTHROMBIN TIME: 13.2 SEC (ref 9.6–13)
RBC # BLD: 4.78 M/UL (ref 4.2–5.9)
SODIUM BLD-SCNC: 142 MMOL/L (ref 136–145)
SPECIFIC GRAVITY UA: >=1.03
TRANSFERRIN: 203 MG/DL (ref 200–360)
URINE TYPE: NORMAL
UROBILINOGEN, URINE: 0.2 E.U./DL
WBC # BLD: 5.4 K/UL (ref 4–11)

## 2018-05-02 ENCOUNTER — TELEPHONE (OUTPATIENT)
Dept: ORTHOPEDIC SURGERY | Age: 53
End: 2018-05-02

## 2018-05-02 LAB
ESTIMATED AVERAGE GLUCOSE: 91.1 MG/DL
HBA1C MFR BLD: 4.8 %
URINE CULTURE, ROUTINE: NORMAL

## 2018-05-07 ENCOUNTER — HOSPITAL ENCOUNTER (OUTPATIENT)
Dept: PHYSICAL THERAPY | Age: 53
Discharge: HOME | End: 2018-05-08
Admitting: ORTHOPAEDIC SURGERY

## 2018-05-09 PROBLEM — M17.11 OSTEOARTHRITIS OF RIGHT KNEE: Status: ACTIVE | Noted: 2018-05-09

## 2018-05-09 PROBLEM — Z96.651 STATUS POST TOTAL RIGHT KNEE REPLACEMENT: Status: ACTIVE | Noted: 2018-05-09

## 2018-05-09 PROBLEM — M17.11 LOCALIZED OSTEOARTHRITIS OF RIGHT KNEE: Chronic | Status: ACTIVE | Noted: 2018-05-09

## 2018-05-10 ENCOUNTER — TELEPHONE (OUTPATIENT)
Dept: CASE MANAGEMENT | Age: 53
End: 2018-05-10

## 2018-05-14 ENCOUNTER — HOSPITAL ENCOUNTER (OUTPATIENT)
Dept: PHYSICAL THERAPY | Age: 53
Discharge: HOME | End: 2018-05-15
Admitting: ORTHOPAEDIC SURGERY

## 2018-05-14 DIAGNOSIS — Z96.651 STATUS POST TOTAL RIGHT KNEE REPLACEMENT: ICD-10-CM

## 2018-05-14 DIAGNOSIS — M17.11 LOCALIZED OSTEOARTHRITIS OF RIGHT KNEE: Primary | Chronic | ICD-10-CM

## 2018-05-14 RX ORDER — OXYCODONE HYDROCHLORIDE AND ACETAMINOPHEN 5; 325 MG/1; MG/1
1 TABLET ORAL EVERY 6 HOURS PRN
Qty: 28 TABLET | Refills: 0 | Status: SHIPPED | OUTPATIENT
Start: 2018-05-14 | End: 2018-05-21

## 2018-05-17 ENCOUNTER — TELEPHONE (OUTPATIENT)
Dept: ORTHOPEDIC SURGERY | Age: 53
End: 2018-05-17

## 2018-05-18 ENCOUNTER — HOSPITAL ENCOUNTER (OUTPATIENT)
Dept: PHYSICAL THERAPY | Age: 53
Discharge: HOME | End: 2018-05-19
Admitting: ORTHOPAEDIC SURGERY

## 2018-05-21 ENCOUNTER — OFFICE VISIT (OUTPATIENT)
Dept: ORTHOPEDIC SURGERY | Age: 53
End: 2018-05-21

## 2018-05-21 ENCOUNTER — HOSPITAL ENCOUNTER (OUTPATIENT)
Dept: PHYSICAL THERAPY | Age: 53
Discharge: HOME | End: 2018-05-22
Admitting: ORTHOPAEDIC SURGERY

## 2018-05-21 VITALS
WEIGHT: 246.03 LBS | BODY MASS INDEX: 28.47 KG/M2 | HEART RATE: 64 BPM | SYSTOLIC BLOOD PRESSURE: 129 MMHG | DIASTOLIC BLOOD PRESSURE: 84 MMHG | HEIGHT: 78 IN

## 2018-05-21 DIAGNOSIS — M17.11 LOCALIZED OSTEOARTHRITIS OF RIGHT KNEE: Chronic | ICD-10-CM

## 2018-05-21 DIAGNOSIS — M25.561 RIGHT KNEE PAIN, UNSPECIFIED CHRONICITY: ICD-10-CM

## 2018-05-21 DIAGNOSIS — Z96.651 STATUS POST TOTAL RIGHT KNEE REPLACEMENT: Primary | ICD-10-CM

## 2018-05-21 PROCEDURE — 99024 POSTOP FOLLOW-UP VISIT: CPT | Performed by: ORTHOPAEDIC SURGERY

## 2018-05-24 ENCOUNTER — HOSPITAL ENCOUNTER (OUTPATIENT)
Dept: PHYSICAL THERAPY | Age: 53
Discharge: HOME | End: 2018-05-25
Admitting: ORTHOPAEDIC SURGERY

## 2018-05-29 ENCOUNTER — HOSPITAL ENCOUNTER (OUTPATIENT)
Dept: PHYSICAL THERAPY | Age: 53
Discharge: HOME | End: 2018-05-30
Admitting: ORTHOPAEDIC SURGERY

## 2018-05-30 ENCOUNTER — TELEPHONE (OUTPATIENT)
Dept: CASE MANAGEMENT | Age: 53
End: 2018-05-30

## 2018-05-30 ENCOUNTER — TELEPHONE (OUTPATIENT)
Dept: ORTHOPEDIC SURGERY | Age: 53
End: 2018-05-30

## 2018-05-30 DIAGNOSIS — M17.0 PRIMARY OSTEOARTHRITIS OF BOTH KNEES: ICD-10-CM

## 2018-05-30 DIAGNOSIS — M25.561 PAIN IN BOTH KNEES, UNSPECIFIED CHRONICITY: ICD-10-CM

## 2018-05-30 DIAGNOSIS — M25.562 PAIN IN BOTH KNEES, UNSPECIFIED CHRONICITY: ICD-10-CM

## 2018-05-31 RX ORDER — MELOXICAM 15 MG/1
15 TABLET ORAL DAILY
Qty: 30 TABLET | Refills: 0 | Status: SHIPPED | OUTPATIENT
Start: 2018-05-31 | End: 2018-07-01 | Stop reason: SDUPTHER

## 2018-06-01 ENCOUNTER — HOSPITAL ENCOUNTER (OUTPATIENT)
Dept: PHYSICAL THERAPY | Age: 53
Discharge: HOME | End: 2018-06-30
Attending: ORTHOPAEDIC SURGERY | Admitting: ORTHOPAEDIC SURGERY

## 2018-06-01 ENCOUNTER — HOSPITAL ENCOUNTER (OUTPATIENT)
Dept: PHYSICAL THERAPY | Age: 53
Discharge: HOME | End: 2018-06-02
Admitting: ORTHOPAEDIC SURGERY

## 2018-06-04 ENCOUNTER — HOSPITAL ENCOUNTER (OUTPATIENT)
Dept: PHYSICAL THERAPY | Age: 53
Discharge: HOME | End: 2018-06-05
Admitting: ORTHOPAEDIC SURGERY

## 2018-06-08 ENCOUNTER — HOSPITAL ENCOUNTER (OUTPATIENT)
Dept: PHYSICAL THERAPY | Age: 53
Discharge: HOME | End: 2018-06-09
Admitting: ORTHOPAEDIC SURGERY

## 2018-06-11 ENCOUNTER — OFFICE VISIT (OUTPATIENT)
Dept: ORTHOPEDIC SURGERY | Age: 53
End: 2018-06-11

## 2018-06-11 ENCOUNTER — HOSPITAL ENCOUNTER (OUTPATIENT)
Dept: PHYSICAL THERAPY | Age: 53
Discharge: HOME | End: 2018-06-12
Admitting: ORTHOPAEDIC SURGERY

## 2018-06-11 VITALS — BODY MASS INDEX: 28.47 KG/M2 | HEIGHT: 78 IN | WEIGHT: 246.03 LBS

## 2018-06-11 DIAGNOSIS — M17.11 LOCALIZED OSTEOARTHRITIS OF RIGHT KNEE: Chronic | ICD-10-CM

## 2018-06-11 DIAGNOSIS — Z96.651 STATUS POST TOTAL RIGHT KNEE REPLACEMENT: Primary | ICD-10-CM

## 2018-06-11 PROCEDURE — 99024 POSTOP FOLLOW-UP VISIT: CPT | Performed by: ORTHOPAEDIC SURGERY

## 2018-06-11 RX ORDER — ASPIRIN 325 MG
325 TABLET ORAL DAILY
COMMUNITY

## 2018-06-14 ENCOUNTER — HOSPITAL ENCOUNTER (OUTPATIENT)
Dept: PHYSICAL THERAPY | Age: 53
Discharge: HOME | End: 2018-06-15
Admitting: ORTHOPAEDIC SURGERY

## 2018-06-18 ENCOUNTER — HOSPITAL ENCOUNTER (OUTPATIENT)
Dept: PHYSICAL THERAPY | Age: 53
Discharge: HOME | End: 2018-06-19
Admitting: ORTHOPAEDIC SURGERY

## 2018-06-21 ENCOUNTER — HOSPITAL ENCOUNTER (OUTPATIENT)
Dept: PHYSICAL THERAPY | Age: 53
Discharge: HOME | End: 2018-06-22
Admitting: ORTHOPAEDIC SURGERY

## 2018-06-25 ENCOUNTER — HOSPITAL ENCOUNTER (OUTPATIENT)
Dept: PHYSICAL THERAPY | Age: 53
Discharge: HOME | End: 2018-06-26
Admitting: ORTHOPAEDIC SURGERY

## 2018-06-28 ENCOUNTER — HOSPITAL ENCOUNTER (OUTPATIENT)
Dept: PHYSICAL THERAPY | Age: 53
Discharge: HOME | End: 2018-06-29
Admitting: ORTHOPAEDIC SURGERY

## 2018-07-01 ENCOUNTER — HOSPITAL ENCOUNTER (OUTPATIENT)
Dept: PHYSICAL THERAPY | Age: 53
Discharge: HOME | End: 2018-07-01
Attending: ORTHOPAEDIC SURGERY | Admitting: ORTHOPAEDIC SURGERY

## 2018-07-01 DIAGNOSIS — M25.562 PAIN IN BOTH KNEES, UNSPECIFIED CHRONICITY: ICD-10-CM

## 2018-07-01 DIAGNOSIS — M25.561 PAIN IN BOTH KNEES, UNSPECIFIED CHRONICITY: ICD-10-CM

## 2018-07-01 DIAGNOSIS — M17.0 PRIMARY OSTEOARTHRITIS OF BOTH KNEES: ICD-10-CM

## 2018-07-02 ENCOUNTER — HOSPITAL ENCOUNTER (OUTPATIENT)
Dept: PHYSICAL THERAPY | Age: 53
Discharge: HOME | End: 2018-07-03
Admitting: ORTHOPAEDIC SURGERY

## 2018-07-02 RX ORDER — MELOXICAM 15 MG/1
15 TABLET ORAL DAILY
Qty: 30 TABLET | Refills: 0 | Status: SHIPPED | OUTPATIENT
Start: 2018-07-02 | End: 2018-11-02 | Stop reason: ALTCHOICE

## 2018-07-02 NOTE — FLOWSHEET NOTE
[facility], [address] [place]  Phone: [phone]  Fax [phone]      ATHLETIC TRAINING [address]  Date:  2018    Patient Name:  Elodia Sanabria    :  1965  MRN: [id number]  Restrictions/Precautions:    Medical/Treatment Diagnosis Information:  ·  R knee OA s/p R TKR  2018  ·  R knee pain, effusion  Physician Information:   Dr. Chika Rock Post-op  8 wks  12 wks 16 wks 20 wks   24 wks                            Activity Log                                                  DOS/DOI: 18                                                   Date: 18   ATC communication: L TKR 3/20/18, hx of blood clots  Goal return to [facility] 18 MD apt 3:20  Sit-stand still weak  Glut fatigued w/  Post reach    Fatigued from new PT RX - needs hip quad strength machines only   Bike        Elliptical        Treadmill        Airdyne                Gastroc stretch        Soleus stretch        Hamstring stretch        ITB stretch        Hip Flexor stretch        Quad stretch        Adductor stretch                Weight Shifting sp                                  fp                                  tp        Lateral walking (with/w/o TB)                Balance: PEP/Bernice board                       SLS 1\" x1 R/L on pad   Airex 1''x1 R/L           Star excursion load/explode              Extremity reach UE/LE   LE 3D R SLS 2x5ea. Leg Press Nelson. 100# 3x15 120# 3x10 120# 3x10 120# 3x12 130# 3x15 (2 holes N)                     Ecc. 80# R/L 3x12 100# R/L 3x10 100# R/L 3x10 100# R/L 3x12 110# R/L 3x15                     Inv.  60# R/L 3x12 80# R/L 3x10 80# R/L 3x10 80# R/L 3x12 90# R/L 3x12           Calf Press Nelson.  120# 3x10 120# 3x12 120# 3x12 130# 3x12                      Ecc.                            Inv.                ROD   Flex                   ABd 30#

## 2018-07-02 NOTE — FLOWSHEET NOTE
[facility], [address] [place]  Phone: [phone]  Fax [phone]    Physical Therapy Daily Treatment Note  Date:  2018    Patient Name:  Viv Rodriguez    :  1965  MRN: [id number]  Restrictions/Precautions:    Medical/Treatment Diagnosis Information:  Diagnosis: R Knee OA M17.11, TKR Z96.659  Treatment Diagnosis: R Knee Pain M25.561, TKR Z96.659, R knee effusion Y22.543  Insurance/Certification information:  PT Insurance Information:  ANTHEM  - 50/25-300D-80/20-$0CP-60PT/OT-NO Guccij[address] danielcoa [id number]  Physician Information:  Referring Practitioner: Dr Andres Newman of care signed (Y/N):     Date of Patient follow up with Physician: 18    G-Code (if applicable):      Date G-Code Applied:    PT G-Codes  Functional Assessment Tool Used: LEFS  Score: LEFS  Functional Limitation: Mobility: Walking and moving around  Mobility: Walking and Moving Around Current Status (): At least 60 percent but less than 80 percent impaired, limited or restricted  Mobility: Walking and Moving Around Goal Status (): At least 20 percent but less than 40 percent impaired, limited or restricted    Progress Note: []  Yes  [x]  No  Next due by: Visit #19      Latex Allergy:  [x]NO      []YES  Preferred Language for Healthcare:   [x]English       []other:    Visit # Insurance Allowable Requires auth   15 45 visits (15 used L TKR)    [x]no        []yes:       Pain level:  1-2 \"stiffness\" mainly/10     SUBJECTIVE:  Pt states his biggest c/o is knee stiffness when he wakes up in the morning and some pulling felt in his distal quad.      OBJECTIVE:   Observation: continued swelling R knee, tightness felt sup incision and distal quad/ITB  Test measurements:  AROM pre-tx 0-126, PROM R knee 0-131 deg,    RESTRICTIONS/PRECAUTIONS: Knee immobilizer until good quad control, L TKR 3/20/18, H/O blood clots    8 weeks 18  Exercises/Interventions: Good [] Fair  [] Poor    Patient Requires Follow-up: [x] Yes  [] No    PLAN: Continue PT 2x per week until PT Goals met.    [x] Continue per plan of care [] Alter current plan (see comments)  [] Plan of care initiated [] Hold pending MD visit [] Discharge    Electronically signed by: Fidelia Sauceda, PT, PT

## 2018-07-05 ENCOUNTER — HOSPITAL ENCOUNTER (OUTPATIENT)
Dept: PHYSICAL THERAPY | Age: 53
Discharge: HOME | End: 2018-07-06
Admitting: ORTHOPAEDIC SURGERY

## 2018-07-05 NOTE — FLOWSHEET NOTE
Notes   Orthovitals  Due NV    Gastroc Belt S   HEP   Long Sit HS S HEP   Standing hip flexor stretch on steps 30\"x3 R/L   QS B - with NMES, see below HEP   Heelslides HEP   SLR  SLR   LE ER with circles 3CW/3CCW     Seated Self assisted knee flexion HEP   Supine SB roll knee flexion     SB Bridge w/ leg lift  NV   SAQ with Add     SL Hip ABD    LAQ with ADD 3#  Leg ext w/ ATC   Bike   elliptical      NV, 6 MWT today   Incline Board 30 sec x 3    Sit to Stand   x6 without Airex, then fatigue so added back in     Plank fwd  Side plank 4x20\"  3x15\" R/L    Wall sit NV   Side-stepping in squat  MW  Retro MW 30'x4 eaRVL at ankles   ATC log X    8 week OrthoVitals X    Patient and education regarding , HEP, proper elevation, activity mod,      Manual Intervention      STM Distal Quad and Superior incision;   SASTM distal quad and ITB  Manual HS S, ITB S B, ; , prone quad S B  Amos test hip flexor/quad S 20'                             NMR re-education     NMES Quad Biphasic 10\"/10\" x 8 min total    Lat weight shift R    Standing HR    Mini squat w/ ATC   Tandem balance    SLB      Cues for TKE on the R  w/ ATC   LSD  NV   BOSU lunge w/ MB reach down to FSU NV   Single leg dead lift NV   BOSU flat up squats 5\" 2x10 RVL @ knees            Therapeutic Exercise and NMR EXR  [x] (20090) Provided verbal/tactile cueing for activities related to strengthening, flexibility, endurance, ROM for improvements in LE, proximal hip, and core control with self care, mobility, lifting, ambulation.  [] (85513) Provided verbal/tactile cueing for activities related to improving balance, coordination, kinesthetic sense, posture, motor skill, proprioception  to assist with LE, proximal hip, and core control in self care, mobility, lifting, ambulation and eccentric single leg control.      NMR and Therapeutic Activities:    [x] (40025 or 36508) Provided verbal/tactile cueing for activities related to improving balance, coordination, kinesthetic sense, posture, motor skill, proprioception and motor activation to allow for proper function of core, proximal hip and LE with self care and ADLs  [] (60327) Gait Re-education- Provided training and instruction to the patient for proper LE, core and proximal hip recruitment and positioning and eccentric body weight control with ambulation re-education including up and down stairs     Home Exercise Program:    [x] (24405) Reviewed/Progressed HEP activities related to strengthening, flexibility, endurance, ROM of core, proximal hip and LE for functional self-care, mobility, lifting and ambulation/stair navigation   [] (79398)Reviewed/Progressed HEP activities related to improving balance, coordination, kinesthetic sense, posture, motor skill, proprioception of core, proximal hip and LE for self care, mobility, lifting, and ambulation/stair navigation      Manual Treatments:  PROM / STM / Oscillations-Mobs:  G-I, II, III, IV (PA's, Inf., Post.)  [x] (18906) Provided manual therapy to mobilize LE, proximal hip and/or LS spine soft tissue/joints for the purpose of modulating pain, promoting relaxation,  increasing ROM, reducing/eliminating soft tissue swelling/inflammation/restriction, improving soft tissue extensibility and allowing for proper ROM for normal function with self care, mobility, lifting and ambulation. Modalities:  /v-pulse x 15 min     Charges:  Timed Code Treatment Minutes: 40   Total Treatment Minutes: 55     [] EVAL (LOW) 68582 (typically 20 minutes face-to-face)  [] EVAL (MOD) 24317 (typically 30 minutes face-to-face)  [] EVAL (HIGH) 46481 (typically 45 minutes face-to-face)  [] RE-EVAL     [x] QA(21546) x  2   [] IONTO  [] NMR (18513) x      [x] VASO  [x] Manual (92100) x  1    [] Other:  [] TA x       [] Mech Traction (38871)  [] ES(attended) (41910)      [] ES (un) (37033):        GOALS:  Patient stated goal: Return to work and play Golf  Therapist goals for Patient:   Short Term Goals:  To be

## 2018-07-09 ENCOUNTER — HOSPITAL ENCOUNTER (OUTPATIENT)
Dept: PHYSICAL THERAPY | Age: 53
Discharge: HOME | End: 2018-07-10
Admitting: ORTHOPAEDIC SURGERY

## 2018-07-09 NOTE — FLOWSHEET NOTE
3x10            Calf Press Nelson. 120# 3x10 120# 3x12 120# 3x12 130# 3x12 130# 3x15 130# 3x10                      Ecc.                             Inv.                  ROD   Flex                    ABd 45# R/L 3x10 60# R/L 3x10 60# R/L 3x10 60# R/L 3x12 60# R/L 3x12 60# R/L 3x15              ADd                   TKE 60# R/L 30x5'' 60# R/L 30x5'' ^NV 75# R/L 30x5'' 75# R/L 30x5'' to march 75# R/L 30x5'' to march 75# R/L 30x5'' to march              Ext 60# R/L 3x10 75# 3x10 75# R/L 3x10 75# R/L 3x12 90# R/L 3x10 90# R/L 3x12            Steps Up                    Up and Over                    Down                    Lateral                    Rotation                  Squats  mini                       wall                      BOSU                  Lunges:  Lunge to Balance                        Balance to Lunge                        Walking                  Knee Extension Bilat. 20# 3x10 20# 3x10 20# 3x10 20# 3x12 20# 3x15 add NV fatigued                              Ecc.                                    Inv. Hamstring Curls Bilat. 60# 3x10 50# 3x10 60# (north) 3x10 60# (north) 3x12 60# (north) 3x15 NV fatigued                              Ecc.                                    Inv.                  Soleus Press Bilat with 1/2 foam roll 50# 3x10 50# 3x10 60# (north) 3x10                             Ecc.                                Inv.     80# R/L 3x10 80# R/L 3x10 NV fatigued            CC Retro Walk         50# fwd/retro 10x ea 50# fwd/retro 10xea 50# 4-way 10x ea w/R/L SLS 5\" ea NV 70# retro x10 50# 3-way 10x ea w/R/L SLS 5\" ea NV fatigued            Ladders                     Square                    Jump/Hop  Low                           Med.                           High                                                                           Modality ES/VPulse 15' ES/VPulse 15' ES/VPulse 15' VPulse 15' VPulse 15' VPulse 15'   Initials ERIC STEWART/DO STEWART/ DB DB DB   Time spent with PT assistant

## 2018-07-09 NOTE — FLOWSHEET NOTE
[facility], [address] [place]  Phone: [phone]  Fax [phone]    Physical Therapy Daily Treatment Note  Date:  2018    Patient Name:  Elodia Sanabria    :  1965  MRN: [id number]  Restrictions/Precautions:    Medical/Treatment Diagnosis Information:  Diagnosis: R Knee OA M17.11, TKR Z96.659  Treatment Diagnosis: R Knee Pain M25.561, TKR Z96.659, R knee effusion A14.867  Insurance/Certification information:  PT Insurance Information:  ANTHEM  - [id number]-80/20-$0CP-60PT/OT-NO Guccim[address] [id number]  Physician Information:  Referring Practitioner: Dr Linda Rushing of care signed (Y/N):     Date of Patient follow up with Physician: 18    G-Code (if applicable):      Date G-Code Applied:    PT G-Codes  Functional Assessment Tool Used: LEFS  Score: LEFS  Functional Limitation: Mobility: Walking and moving around  Mobility: Walking and Moving Around Current Status (): At least 60 percent but less than 80 percent impaired, limited or restricted  Mobility: Walking and Moving Around Goal Status (): At least 20 percent but less than 40 percent impaired, limited or restricted    Progress Note: []  Yes  [x]  No  Next due by: Visit #19      Latex Allergy:  [x]NO      []YES  Preferred Language for Healthcare:   [x]English       []other:    Visit # Insurance Allowable Requires auth   17 45 visits (15 used L TKR)    [x]no        []yes:       Pain level:  1-2 \"stiffness\" mainly/10     SUBJECTIVE: Patient reports that he did a Live Training Fire and did fine. He was a little sore afterwards, but did ok. Patient reports less pain over the quad muscle, but still very tight.   OBJECTIVE: OrthoVitals  Observation: continued swelling R knee, tightness felt sup incision and distal quad/ITB  Test measurements:      RESTRICTIONS/PRECAUTIONS: Knee immobilizer until good quad control, L TKR 3/20/18, H/O blood clots    8 weeks stated goal: Return to work and play Golf  Therapist goals for Patient:   Short Term Goals: To be achieved in: 2 weeks  1. Independent in HEP and progression per patient tolerance, in order to prevent re-injury. 2. Patient will have a decrease in pain to facilitate improvement in movement, function, and ADLs as indicated by Functional Deficits. Long Term Goals: To be achieved in: 12 weeks  1. Disability index score of 39% or less for the LEFS to assist with reaching prior level of function. MET  2. Patient will demonstrate increased AROM to 0-130 to allow for proper joint functioning as indicated by patients Functional Deficits. MET  3. Patient will demonstrate an increase in Strength to good proximal hip strength and control, within 5lb HHD in LE to allow for proper functional mobility as indicated by patients Functional Deficits. PROGRESSING  4. Patient will return to sleeping through the night, standing and walking for at least one hour at a time, able to lift for work and climb stairs functional activities without increased symptoms or restriction. PROGRESSING  5. Return to work and play Golf (patient specific functional goal)  PROGRESSING    Progression Towards Functional goals:  [x] Patient is progressing as expected towards functional goals listed. [] Progression is slowed due to complexities listed. [] Progression has been slowed due to co-morbidities. [] Plan just implemented, too soon to assess goals progression  [] Other:     ASSESSMENT: Patient was challenged with Smita Cambric sits with an appropriate level of muscle fatigue, with no report of pain. No knee pain waking patient at night, only soreness.     Treatment/Activity Tolerance:  [x] Patient tolerated treatment well [] Patient limited by fatique  [] Patient limited by pain  [] Patient limited by other medical complications  [] Other:     Prognosis: [x] Good [] Fair  [] Poor    Patient Requires Follow-up: [x] Yes  [] No    PLAN: Continue PT 2x per

## 2018-07-30 ENCOUNTER — HOSPITAL ENCOUNTER (OUTPATIENT)
Dept: PHYSICAL THERAPY | Age: 53
Setting detail: THERAPIES SERIES
Discharge: HOME | End: 2018-07-30
Payer: COMMERCIAL

## 2018-07-30 ENCOUNTER — OFFICE VISIT (OUTPATIENT)
Dept: ORTHOPEDIC SURGERY | Age: 53
End: 2018-07-30

## 2018-07-30 VITALS — DIASTOLIC BLOOD PRESSURE: 84 MMHG | SYSTOLIC BLOOD PRESSURE: 136 MMHG | HEART RATE: 62 BPM

## 2018-07-30 DIAGNOSIS — M17.0 PRIMARY OSTEOARTHRITIS OF BOTH KNEES: Primary | ICD-10-CM

## 2018-07-30 PROCEDURE — 97110 THERAPEUTIC EXERCISES: CPT

## 2018-07-30 PROCEDURE — G8978 MOBILITY CURRENT STATUS: HCPCS

## 2018-07-30 PROCEDURE — 99024 POSTOP FOLLOW-UP VISIT: CPT | Performed by: ORTHOPAEDIC SURGERY

## 2018-07-30 PROCEDURE — G8980 MOBILITY D/C STATUS: HCPCS

## 2018-07-30 PROCEDURE — G8979 MOBILITY GOAL STATUS: HCPCS

## 2018-07-30 NOTE — FLOWSHEET NOTE
[facility] [facility], [address] [place]  Phone: [phone]  Fax [phone]    Physical Therapy Daily Treatment Note  Date:  2018    Patient Name:  Rhonda Mejia    :  1965  MRN: [id number]  Restrictions/Precautions:    Medical/Treatment Diagnosis Information:  Diagnosis: R Knee OA M17.11, TKR Z96.659  Treatment Diagnosis: R Knee Pain M25.561, TKR Z96.659, R knee effusion S83.344  Insurance/Certification information:  PT Insurance Information:  [employer]HE[employer]  - [id number] [address]cc[id number]  Physician Information:  Referring Practitioner: Dr Matt Peters of care signed (Y/N):     Date of Patient follow up with Physician: 18    G-Code (if applicable):      Date G-Code Applied:    PT G-Codes  Functional Assessment Tool Used: LEFS  Score: LEFS  Functional Limitation: Mobility: Walking and moving around  Mobility: Walking and Moving Around Current Status (): At least 60 percent but less than 80 percent impaired, limited or restricted  Mobility: Walking and Moving Around Goal Status (): At least 20 percent but less than 40 percent impaired, limited or restricted    Progress Note: []  Yes  [x]  No  Next due by: Visit #19      Latex Allergy:  [x]NO      []YES  Preferred Language for Healthcare:   [x]English       []other:    Visit # Insurance Allowable Requires auth   17 45 visits (15 used L TKR)    [x]no        []yes:       Pain level:  1-2 \"stiffness\" mainly/10     SUBJECTIVE: Patient reports a little more stiffness in his R knee, especially in the morning. OBJECTIVE: OrthoVitals  Observation: continued swelling R knee, tightness felt sup incision and distal quad/ITB  Test measurements:      RESTRICTIONS/PRECAUTIONS: Knee immobilizer until good quad control, L TKR 3/20/18, H/O blood clots    8 weeks 18  Exercises/Interventions: Final HEP with B LE weight training.     Therapeutic Ex Sets/sec/reps Notes   Orthovitals  Due NV    Gastroc Belt S   HEP   Long Sit HS S HEP   Standing hip flexor stretch on steps 30\"x3 R/L   QS B - with NMES, see below HEP   Heelslides HEP   SLR  SLR   LE ER with circles 3CW/3CCW     Seated Self assisted knee flexion HEP   Supine SB roll knee flexion     SB Bridge w/ leg lift  NV   SAQ with Add     SL Hip ABD    LAQ with ADD 3#  Leg ext w/ ATC   Bike   elliptical      NV, 6 MWT today   Incline Board 30 sec x 3    Sit to Stand   x6 without Airex, then fatigue so added back in     Plank fwd  Side plank 4x20\"  3x15\" R/L    Wall sit 10 sec x 10ROL above knees   Side-stepping in squat  MW  Retro MW 30'x4 eaRVL at ankles   ATC log X    DC OrthoVitals X    Patient and education regarding , HEP, proper elevation, activity mod,      Manual Intervention      STM Distal Quad and Superior incision;   SASTM distal quad and ITB  Manual HS S, ITB S B, ; , prone quad S B  Amos test hip flexor/quad S                              NMR re-education     NMES Quad Biphasic 10\"/10\" x 8 min total    Lat weight shift R    Standing HR    Mini squat 2x10   Tandem balance    SLB      Cues for TKE on the R  w/ ATC   LSD  NV   BOSU lunge w/ MB reach down to FSU NV   Single leg dead lift NV   BOSU flat up squats  RVL @ knees            Therapeutic Exercise and NMR EXR  [x] (62548) Provided verbal/tactile cueing for activities related to strengthening, flexibility, endurance, ROM for improvements in LE, proximal hip, and core control with self care, mobility, lifting, ambulation.  [] (97866) Provided verbal/tactile cueing for activities related to improving balance, coordination, kinesthetic sense, posture, motor skill, proprioception  to assist with LE, proximal hip, and core control in self care, mobility, lifting, ambulation and eccentric single leg control.      NMR and Therapeutic Activities:    [x] (11242 or 26409) Provided verbal/tactile cueing for activities related to improving balance, coordination, kinesthetic sense, posture, motor skill, proprioception and motor activation to allow for proper function of core, proximal hip and LE with self care and ADLs  [] (19819) Gait Re-education- Provided training and instruction to the patient for proper LE, core and proximal hip recruitment and positioning and eccentric body weight control with ambulation re-education including up and down stairs     Home Exercise Program:    [x] (03911) Reviewed/Progressed HEP activities related to strengthening, flexibility, endurance, ROM of core, proximal hip and LE for functional self-care, mobility, lifting and ambulation/stair navigation   [] (11636)Reviewed/Progressed HEP activities related to improving balance, coordination, kinesthetic sense, posture, motor skill, proprioception of core, proximal hip and LE for self care, mobility, lifting, and ambulation/stair navigation      Manual Treatments:  PROM / STM / Oscillations-Mobs:  G-I, II, III, IV (PA's, Inf., Post.)  [x] (11076) Provided manual therapy to mobilize LE, proximal hip and/or LS spine soft tissue/joints for the purpose of modulating pain, promoting relaxation,  increasing ROM, reducing/eliminating soft tissue swelling/inflammation/restriction, improving soft tissue extensibility and allowing for proper ROM for normal function with self care, mobility, lifting and ambulation.      Modalities:  /v-pulse x 15 min     Charges:  Timed Code Treatment Minutes: 40   Total Treatment Minutes: 55     [] EVAL (LOW) 27379 (typically 20 minutes face-to-face)  [] EVAL (MOD) 90730 (typically 30 minutes face-to-face)  [] EVAL (HIGH) 04023 (typically 45 minutes face-to-face)  [] RE-EVAL     [x] PS(02508) x  3   [] IONTO  [] NMR (70916) x      [] VASO  [] Manual (06867) x  1    [] Other:  [] TA x       [] Mech Traction (84678)  [] ES(attended) (75914)      [] ES (un) (90610):        GOALS:  Patient stated goal: Return to work and play Golf  Therapist goals for Patient:   Short Term Goals: To be achieved in: 2 weeks  1. Independent in HEP and progression per patient tolerance, in order to prevent re-injury. 2. Patient will have a decrease in pain to facilitate improvement in movement, function, and ADLs as indicated by Functional Deficits. Long Term Goals: To be achieved in: 12 weeks  1. Disability index score of 39% or less for the LEFS to assist with reaching prior level of function. MET  2. Patient will demonstrate increased AROM to 0-130 to allow for proper joint functioning as indicated by patients Functional Deficits. MET  3. Patient will demonstrate an increase in Strength to good proximal hip strength and control, within 5lb HHD in LE to allow for proper functional mobility as indicated by patients Functional Deficits MET  4. Patient will return to sleeping through the night, standing and walking for at least one hour at a time, able to lift for work and climb stairs functional activities without increased symptoms or restriction. MET  5. Return to work and play Golf (patient specific functional goal)  MET    Progression Towards Functional goals:  [x] Patient is progressing as expected towards functional goals listed. [] Progression is slowed due to complexities listed. [] Progression has been slowed due to co-morbidities. [] Plan just implemented, too soon to assess goals progression  [] Other:     ASSESSMENT: Met all PT Goals. .    Treatment/Activity Tolerance:  [x] Patient tolerated treatment well [] Patient limited by fatique  [] Patient limited by pain  [] Patient limited by other medical complications  [] Other:     Prognosis: [x] Good [] Fair  [] Poor    Patient Requires Follow-up: [x] Yes  [] No    PLAN: DC to HEP.    [x] Continue per plan of care [] Alter current plan (see comments)  [] Plan of care initiated [] Hold pending MD visit [] Discharge    Electronically signed by: Yusra Cardozo, PT

## 2018-07-30 NOTE — LETTER
[place] [address]  Phone: [phone]  Fax: [phone]    Carolyn Schulz MD        July 30, 2018     Patient: Alena Crouch   YOB: 1965   Date of Visit: 7/30/2018       To Whom It May Concern: It is my medical opinion that Linda Coelho may return to full duty. Patient is fit for full firefighting duty. If you have any questions or concerns, please don't hesitate to call.     Sincerely,    7/30/2018 1:21 PM    Carolyn Schulz MD

## 2018-07-30 NOTE — PROGRESS NOTES
History of present illness: The patient returns today after 3 months status post right total knee replacement. Pain control has been satisfactory with oral medications. There have been no fevers or chills. 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 Pain Assessment  Location of Pain: Knee  Location Modifiers: Right  Severity of Pain: 0]    Physical examination: The incision is clean, dry, and intact with no drainage or signs of infection. Range of motion is 0 degrees of extension to 120 degrees of flexion. There is expected swelling with no evidence of DVT and a negative Homans sign. Neurovascular exam is intact. Assessment/plan: At this time is doing well. We'll go ahead and release him back to work full duty without restrictions. We'll plan on seeing him back in one year for an annual checkup or sooner if there is any issues.

## 2018-08-09 RX ORDER — AMOXICILLIN AND CLAVULANATE POTASSIUM 875; 125 MG/1; MG/1
1 TABLET, FILM COATED ORAL 2 TIMES DAILY
Qty: 6 TABLET | Refills: 0 | Status: SHIPPED | OUTPATIENT
Start: 2018-08-09 | End: 2018-08-12

## 2018-10-18 RX ORDER — AMOXICILLIN AND CLAVULANATE POTASSIUM 875; 125 MG/1; MG/1
1 TABLET, FILM COATED ORAL 2 TIMES DAILY
Qty: 6 TABLET | Refills: 0 | Status: SHIPPED | OUTPATIENT
Start: 2018-10-18 | End: 2018-10-21

## 2018-11-02 ENCOUNTER — APPOINTMENT (OUTPATIENT)
Dept: GENERAL RADIOLOGY | Age: 53
End: 2018-11-02
Payer: COMMERCIAL

## 2018-11-02 ENCOUNTER — HOSPITAL ENCOUNTER (EMERGENCY)
Age: 53
Discharge: HOME | End: 2018-11-02
Payer: COMMERCIAL

## 2018-11-02 VITALS
BODY MASS INDEX: 29.52 KG/M2 | HEART RATE: 60 BPM | OXYGEN SATURATION: 99 % | RESPIRATION RATE: 16 BRPM | HEIGHT: 77 IN | SYSTOLIC BLOOD PRESSURE: 112 MMHG | DIASTOLIC BLOOD PRESSURE: 75 MMHG | TEMPERATURE: 98.2 F | WEIGHT: 250 LBS

## 2018-11-02 DIAGNOSIS — S66.912A STRAIN OF LEFT WRIST, INITIAL ENCOUNTER: ICD-10-CM

## 2018-11-02 DIAGNOSIS — M25.512 ACUTE PAIN OF LEFT SHOULDER: ICD-10-CM

## 2018-11-02 DIAGNOSIS — T14.8XXA ABRASION: ICD-10-CM

## 2018-11-02 DIAGNOSIS — M25.562 ACUTE PAIN OF LEFT KNEE: ICD-10-CM
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
## 2018-11-02 PROCEDURE — 73030 X-RAY EXAM OF SHOULDER: CPT

## 2018-11-02 PROCEDURE — 73110 X-RAY EXAM OF WRIST: CPT

## 2018-11-02 PROCEDURE — 73562 X-RAY EXAM OF KNEE 3: CPT

## 2018-11-02 PROCEDURE — 6370000000 HC RX 637 (ALT 250 FOR IP): Performed by: NURSE PRACTITIONER

## 2018-11-02 PROCEDURE — 99284 EMERGENCY DEPT VISIT MOD MDM: CPT

## 2018-11-02 RX ORDER — METHOCARBAMOL 750 MG/1
750 TABLET, FILM COATED ORAL 4 TIMES DAILY
Qty: 40 TABLET | Refills: 0 | Status: SHIPPED | OUTPATIENT
Start: 2018-11-02 | End: 2018-11-12

## 2018-11-02 RX ORDER — NAPROXEN 500 MG/1
500 TABLET ORAL 2 TIMES DAILY
Qty: 30 TABLET | Refills: 0 | Status: SHIPPED | OUTPATIENT
Start: 2018-11-02

## 2018-11-02 RX ORDER — IBUPROFEN 800 MG/1
800 TABLET ORAL ONCE
Status: COMPLETED | OUTPATIENT
Start: 2018-11-02 | End: 2018-11-02

## 2018-11-02 RX ADMIN — IBUPROFEN 800 MG: 800 TABLET, FILM COATED ORAL at 17:26

## 2018-11-02 ASSESSMENT — PAIN DESCRIPTION - LOCATION
LOCATION: SHOULDER;KNEE
LOCATION: SHOULDER

## 2018-11-02 ASSESSMENT — PAIN SCALES - GENERAL
PAINLEVEL_OUTOF10: 7
PAINLEVEL_OUTOF10: 6
PAINLEVEL_OUTOF10: 7

## 2018-11-02 ASSESSMENT — PAIN DESCRIPTION - ORIENTATION
ORIENTATION: LEFT
ORIENTATION: LEFT
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
## 2018-11-02 NOTE — ED NOTES
Patient verbalized understanding of d/c instructions. Patient given Robaxin and Naprosyn prescriptions. Patient ambulated from ER without difficulty.      Diamante Keen LPN  67/02/13 0975
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
## 2018-12-06 ENCOUNTER — OFFICE VISIT (OUTPATIENT)
Dept: ORTHOPEDIC SURGERY | Age: 53
End: 2018-12-06
Payer: COMMERCIAL

## 2018-12-06 VITALS
DIASTOLIC BLOOD PRESSURE: 80 MMHG | BODY MASS INDEX: 29.52 KG/M2 | WEIGHT: 250 LBS | HEIGHT: 77 IN | SYSTOLIC BLOOD PRESSURE: 121 MMHG | HEART RATE: 62 BPM

## 2018-12-06 DIAGNOSIS — S46.012A STRAIN OF LEFT ROTATOR CUFF CAPSULE, INITIAL ENCOUNTER: Primary | ICD-10-CM

## 2018-12-06 DIAGNOSIS — M25.512 LEFT SHOULDER PAIN, UNSPECIFIED CHRONICITY: ICD-10-CM

## 2018-12-06 PROCEDURE — 99213 OFFICE O/P EST LOW 20 MIN: CPT | Performed by: ORTHOPAEDIC SURGERY

## 2018-12-06 NOTE — PROGRESS NOTES
 metoprolol tartrate (LOPRESSOR) 25 MG tablet Take 25 mg by mouth daily as needed PRN only       No current facility-administered medications for this visit. Current Outpatient Prescriptions on File Prior to Visit   Medication Sig Dispense Refill    naproxen (NAPROSYN) 500 MG tablet Take 1 tablet by mouth 2 times daily 30 tablet 0    aspirin 325 MG tablet Take 325 mg by mouth daily      famotidine (PEPCID) 20 MG tablet Take 20 mg by mouth daily      BREO ELLIPTA 100-25 MCG/INH AEPB inhaler Inhale 1 puff into the lungs daily as needed       metoprolol tartrate (LOPRESSOR) 25 MG tablet Take 25 mg by mouth daily as needed PRN only       No current facility-administered medications on file prior to visit. Review of Systems:  Relevant review of systems reviewed and available in the patient's chart    Vital Signs:  /80   Pulse 62   Ht 6' 5.01\" (1.956 m)   Wt 250 lb (113.4 kg)   BMI 29.64 kg/m²     General Exam:   Constitutional: Patient is adequately groomed with no evidence of malnutrition  DTRs: Deep tendon reflexes are intact  Mental Status: The patient is oriented to time, place and person. The patient's mood and affect are appropriate. Lymphatic: The lymphatic examination bilaterally reveals all areas to be without enlargement or induration. Vascular: Examination reveals no swelling or calf tenderness. Peripheral pulses are palpable and 2+. Neurological: The patient has good coordination. There is no weakness or sensory deficits    Shoulder Examination:    Inspection:  No rashes, scars, or lesions. No deformity or atrophy. Deformity consistent with clavicle with old healed fracture. Palpation:   Mild tenderness over the acromioclavicular joint joint with less tenderness over the bicipital groove     Range of Motion:   180° of forward flexion, external rotation 70°, internal rotation L2    Strength:   5 over 5 strength with internal and external rotation.   4/5 with elevation

## 2019-01-09 DIAGNOSIS — M75.42 IMPINGEMENT SYNDROME OF LEFT SHOULDER: Primary | ICD-10-CM

## 2019-01-21 ENCOUNTER — HOSPITAL ENCOUNTER (OUTPATIENT)
Dept: PHYSICAL THERAPY | Age: 54
Setting detail: THERAPIES SERIES
Discharge: HOME | End: 2019-01-21
Payer: COMMERCIAL

## 2019-01-21 PROCEDURE — 97110 THERAPEUTIC EXERCISES: CPT

## 2019-01-21 PROCEDURE — G8985 CARRY GOAL STATUS: HCPCS

## 2019-01-21 PROCEDURE — G8984 CARRY CURRENT STATUS: HCPCS

## 2019-01-21 PROCEDURE — 97161 PT EVAL LOW COMPLEX 20 MIN: CPT

## 2019-01-25 ENCOUNTER — HOSPITAL ENCOUNTER (OUTPATIENT)
Dept: PHYSICAL THERAPY | Age: 54
Setting detail: THERAPIES SERIES
Discharge: HOME | End: 2019-01-25
Payer: COMMERCIAL

## 2019-01-25 PROCEDURE — 97110 THERAPEUTIC EXERCISES: CPT

## 2019-01-29 ENCOUNTER — HOSPITAL ENCOUNTER (OUTPATIENT)
Dept: PHYSICAL THERAPY | Age: 54
Setting detail: THERAPIES SERIES
Discharge: HOME | End: 2019-01-29
Payer: COMMERCIAL

## 2019-01-29 PROCEDURE — 97110 THERAPEUTIC EXERCISES: CPT

## 2019-02-04 ENCOUNTER — HOSPITAL ENCOUNTER (OUTPATIENT)
Dept: PHYSICAL THERAPY | Age: 54
Setting detail: THERAPIES SERIES
Discharge: HOME | End: 2019-02-04
Payer: COMMERCIAL

## 2019-02-04 PROCEDURE — G8986 CARRY D/C STATUS: HCPCS

## 2019-02-04 PROCEDURE — G8984 CARRY CURRENT STATUS: HCPCS

## 2019-02-04 PROCEDURE — 97110 THERAPEUTIC EXERCISES: CPT

## 2019-02-04 PROCEDURE — G8985 CARRY GOAL STATUS: HCPCS

## 2019-02-07 ENCOUNTER — TELEPHONE (OUTPATIENT)
Dept: ORTHOPEDIC SURGERY | Age: 54
End: 2019-02-07

## 2019-04-29 ENCOUNTER — OFFICE VISIT (OUTPATIENT)
Dept: ORTHOPEDIC SURGERY | Age: 54
End: 2019-04-29
Payer: COMMERCIAL

## 2019-04-29 DIAGNOSIS — Z96.653 HISTORY OF TOTAL BILATERAL KNEE REPLACEMENT: Primary | ICD-10-CM

## 2019-04-29 PROCEDURE — 99213 OFFICE O/P EST LOW 20 MIN: CPT | Performed by: ORTHOPAEDIC SURGERY

## 2019-04-29 NOTE — PROGRESS NOTES
reviewed and available in the patient's chart    Vital Signs: There were no vitals taken for this visit. General Exam:   Constitutional: Patient is adequately groomed with no evidence of malnutrition  DTRs: Deep tendon reflexes are intact  Mental Status: The patient is oriented to time, place and person. The patient's mood and affect are appropriate. Lymphatic: The lymphatic examination bilaterally reveals all areas to be without enlargement or induration. Vascular: Examination reveals no swelling or calf tenderness. Peripheral pulses are palpable and 2+. Neurological: The patient has good coordination. There is no weakness or sensory deficit. Left and right Knee Examination:    Inspection:  There is a well-healed surgical incision without any signs of infection. Palpation:  Nontender to palpation    Range of Motion:  0-120° of knee flexion    Strength:  5/5 quad and hamstring strength    Special Tests:  Stable to varus and valgus stress testing    Skin: There are no rashes, ulcerations or lesions. Gait: Normal gait pattern    Reflex normal deep tendon reflexes    Additional Comments:         Radiology:     X-rays obtained and reviewed in office:  Views 3 views including AP, lateral, skyline  Location left and right knee  Impression there is excellent alignment of the prosthesis with no is a septic or aseptic loosening, polyethylene wear, or periprosthetic fractures. Impression:  Encounter Diagnosis   Name Primary?  History of total bilateral knee replacement Yes       Office Procedures:  Orders Placed This Encounter   Procedures    XR KNEE RIGHT (3 VIEWS)     Standing Status:   Future     Number of Occurrences:   1     Standing Expiration Date:   4/24/2020    XR KNEE LEFT (3 VIEWS)     Standing Status:   Future     Number of Occurrences:   1     Standing Expiration Date:   4/24/2020       Treatment Plan: At this time the patient is doing very well at 1 year postop.   Would recommend a continued maintenance program for strengthening and endurance. She continually work at maintaining healthy bodyweight. We'll plan on seeing them back every 4-5 years for routine checkup or sooner if there is any problems.

## 2020-10-08 ENCOUNTER — OFFICE VISIT (OUTPATIENT)
Dept: ORTHOPEDIC SURGERY | Age: 55
End: 2020-10-08
Payer: COMMERCIAL

## 2020-10-08 VITALS — HEIGHT: 77 IN | BODY MASS INDEX: 29.52 KG/M2 | WEIGHT: 250 LBS

## 2020-10-08 PROCEDURE — 99213 OFFICE O/P EST LOW 20 MIN: CPT | Performed by: ORTHOPAEDIC SURGERY

## 2020-10-08 NOTE — PROGRESS NOTES
Chief Complaint    Elbow Pain (joesph elbow pain x1year getting a little worse, has numbness & tingling in arm after sleeping all night)      History of Present Illness:  Julianna Frazier is a 54 y.o. male who returns today for evaluation of left greater than right elbow pain. Patient states for the past year he has been experiencing numbness and tingling along the lateral border of his left greater than right forearm especially after sleeping. He states that once he is awake and puts his elbow and wrist through range of motion for a few seconds numbness and tingling dissipates. Patient is right-hand dominant and denies any weakness with grasping or lifting objects. He has no symptoms throughout the day but if he is sitting for any period of time with his elbow flexed he begins to experience mild numbness radiating down his forearm. He noticed the decreased sensation into his fourth and fifth fingers. When he extends the elbow his symptoms improved. Pain Assessment  Location of Pain: Elbow  Location Modifiers: Left, Right  Severity of Pain: 2  Quality of Pain: Dull, Aching  Duration of Pain: Persistent  Frequency of Pain: Intermittent  Aggravating Factors: Bending, Stretching, Straightening  Limiting Behavior: No  Relieving Factors: Rest, Other (Comment)  Result of Injury: No  Work-Related Injury: No  Are there other pain locations you wish to document?: No]    Medical History:  Patient's medications, allergies, past medical, surgical, social and family histories were reviewed and updated as appropriate. Review of Systems:  Pertinent items are noted in HPI  Review of systems reviewed from Patient History Form dated on 10/8/2020 and available in the patient's chart under the Media tab. Vital Signs:  Ht 6' 5\" (1.956 m)   Wt 250 lb (113.4 kg)   BMI 29.65 kg/m²     General Exam:   Constitutional: Patient is adequately groomed with no evidence of malnutrition  Mental Status:  The patient is oriented to time, place and person. The patient's mood and affect are appropriate. Neurological: The patient has good coordination. There is no weakness or sensory deficit. Left and right elbow examination:    Inspection: Today's inspection of the elbows reveal the skin to be intact with no signs of swelling. This is noted bilaterally. Palpation: There is mild tenderness to palpation over the lateral aspect of the left greater than right elbow    Range of Motion: Range of motion of both elbows are noted to be full without pain    Strength: There are no strength deficits noted upon testing of either left upper extremity    Special Tests: Distal neurovascular status of both upper extremities are noted to be intact without any deficits    Skin: There are no rashes, ulcerations or lesions. Distal neurovascular is grossly intact and capillary refill is within 2 seconds    Radiology:     X-rays obtained and reviewed in office:  Views 2 views to include AP and lateral of the left and right elbow  Location left and right elbow  Impression there is minimal degenerative changes noted within either elbow and there are no acute or subacute fractures noted. Assessment : Mild ulnar nerve entrapment/tunnel syndrome left greater right elbow    Impression:  Encounter Diagnoses   Name Primary?  Elbow pain, right Yes    Elbow pain, left     Cubital tunnel syndrome of both upper extremities     Ulnar nerve entrapment at elbow, unspecified laterality        Office Procedures:  Orders Placed This Encounter   Procedures    XR ELBOW RIGHT (2 VIEWS)     Standing Status:   Future     Number of Occurrences:   1     Standing Expiration Date:   10/6/2021    XR ELBOW LEFT (2 VIEWS)     Standing Status:   Future     Number of Occurrences:   1     Standing Expiration Date:   10/6/2021       Treatment Plan:  Today we discussed the diagnosis and treatment plan and at this point the patient's can continue with conservative treatments to include night splints that he can attain through our office where he may obtain them on his own. If he continues to have increasing symptoms to return for diagnostic studies to include EMG. Otherwise he will follow-up on a as needed basis.